# Patient Record
Sex: FEMALE | Race: BLACK OR AFRICAN AMERICAN
[De-identification: names, ages, dates, MRNs, and addresses within clinical notes are randomized per-mention and may not be internally consistent; named-entity substitution may affect disease eponyms.]

---

## 2017-03-16 ENCOUNTER — HOSPITAL ENCOUNTER (OUTPATIENT)
Dept: HOSPITAL 62 - WI | Age: 57
End: 2017-03-16
Attending: NURSE PRACTITIONER
Payer: COMMERCIAL

## 2017-03-16 DIAGNOSIS — Z12.31: Primary | ICD-10-CM

## 2017-03-16 PROCEDURE — 77067 SCR MAMMO BI INCL CAD: CPT

## 2017-03-16 PROCEDURE — G0202 SCR MAMMO BI INCL CAD: HCPCS

## 2017-04-01 ENCOUNTER — HOSPITAL ENCOUNTER (EMERGENCY)
Dept: HOSPITAL 62 - ER | Age: 57
Discharge: HOME | End: 2017-04-01
Payer: COMMERCIAL

## 2017-04-01 VITALS — SYSTOLIC BLOOD PRESSURE: 169 MMHG | DIASTOLIC BLOOD PRESSURE: 89 MMHG

## 2017-04-01 DIAGNOSIS — Z71.6: ICD-10-CM
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
## 2017-04-01 DIAGNOSIS — I10: ICD-10-CM

## 2017-04-01 DIAGNOSIS — F17.210: ICD-10-CM

## 2017-04-01 DIAGNOSIS — S20.211A: Primary | ICD-10-CM

## 2017-04-01 DIAGNOSIS — R07.81: ICD-10-CM

## 2017-04-01 PROCEDURE — 99283 EMERGENCY DEPT VISIT LOW MDM: CPT
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
## 2017-07-20 ENCOUNTER — HOSPITAL ENCOUNTER (EMERGENCY)
Dept: HOSPITAL 62 - ER | Age: 57
Discharge: HOME | End: 2017-07-20
Payer: COMMERCIAL

## 2017-07-20 VITALS — SYSTOLIC BLOOD PRESSURE: 142 MMHG | DIASTOLIC BLOOD PRESSURE: 80 MMHG

## 2017-07-20 DIAGNOSIS — I10: ICD-10-CM

## 2017-07-20 DIAGNOSIS — R51: Primary | ICD-10-CM

## 2017-07-20 LAB
ANION GAP SERPL CALC-SCNC: 12 MMOL/L (ref 5–19)
BASOPHILS # BLD AUTO: 0.1 10^3/UL (ref 0–0.2)
BASOPHILS NFR BLD AUTO: 1.3 % (ref 0–2)
BUN SERPL-MCNC: 13 MG/DL (ref 7–20)
CALCIUM: 9 MG/DL (ref 8.4–10.2)
CHLORIDE SERPL-SCNC: 98 MMOL/L (ref 98–107)
CO2 SERPL-SCNC: 28 MMOL/L (ref 22–30)
CREAT SERPL-MCNC: 0.73 MG/DL (ref 0.52–1.25)
EOSINOPHIL # BLD AUTO: 0.3 10^3/UL (ref 0–0.6)
EOSINOPHIL NFR BLD AUTO: 4.4 % (ref 0–6)
ERYTHROCYTE [DISTWIDTH] IN BLOOD BY AUTOMATED COUNT: 14.4 % (ref 11.5–14)
GLUCOSE SERPL-MCNC: 93 MG/DL (ref 75–110)
HCT VFR BLD CALC: 41.8 % (ref 36–47)
HGB BLD-MCNC: 14.1 G/DL (ref 12–15.5)
HGB HCT DIFFERENCE: 0.5
LYMPHOCYTES # BLD AUTO: 2.5 10^3/UL (ref 0.5–4.7)
LYMPHOCYTES NFR BLD AUTO: 39.2 % (ref 13–45)
MCH RBC QN AUTO: 31.8 PG (ref 27–33.4)
MCHC RBC AUTO-ENTMCNC: 33.6 G/DL (ref 32–36)
MCV RBC AUTO: 95 FL (ref 80–97)
MONOCYTES # BLD AUTO: 0.9 10^3/UL (ref 0.1–1.4)
MONOCYTES NFR BLD AUTO: 14.6 % (ref 3–13)
NEUTROPHILS # BLD AUTO: 2.6 10^3/UL (ref 1.7–8.2)
NEUTS SEG NFR BLD AUTO: 40.5 % (ref 42–78)
POTASSIUM SERPL-SCNC: 3.5 MMOL/L (ref 3.6–5)
RBC # BLD AUTO: 4.42 10^6/UL (ref 3.72–5.28)
SODIUM SERPL-SCNC: 138.3 MMOL/L (ref 137–145)
WBC # BLD AUTO: 6.3 10^3/UL (ref 4–10.5)

## 2017-07-20 PROCEDURE — 85025 COMPLETE CBC W/AUTO DIFF WBC: CPT

## 2017-07-20 PROCEDURE — 36415 COLL VENOUS BLD VENIPUNCTURE: CPT

## 2017-07-20 PROCEDURE — 93005 ELECTROCARDIOGRAM TRACING: CPT

## 2017-07-20 PROCEDURE — 99284 EMERGENCY DEPT VISIT MOD MDM: CPT

## 2017-07-20 PROCEDURE — 93010 ELECTROCARDIOGRAM REPORT: CPT

## 2017-07-20 PROCEDURE — 80048 BASIC METABOLIC PNL TOTAL CA: CPT

## 2017-07-20 NOTE — ER DOCUMENT REPORT
ED General





- General


Chief Complaint: Headache >24 hrs old


Stated Complaint: BLOOD PRESSURE PROBLEM


Time Seen by Provider: 07/20/17 14:30


Notes: 


Patient presents stating that she took her blood pressure at home and noticed 

that it was high.  She says that it was approximately 150/105.  This made her 

anxious.  She also felt that she had some pressure behind her eyes.  Therefore 

she came to the emergency room for evaluation.  Nothing makes the pressure 

better or worse.  It does not radiate.  It has been constant but slightly 

improving over time.  She denies any chest pain or shortness of breath.  No 

nausea vomiting or diarrhea.


TRAVEL OUTSIDE OF THE [place] IN LAST 30 DAYS: No





- Related Data


Allergies/Adverse Reactions: 


 





No Known Allergies Allergy (Verified 07/20/17 14:13)


 








Home Medications: 


 Current Home Medications





Bupropion HCl [Bupropion Xl] 150 mg PO DAILY 07/20/17 [History]


Diazepam 5 mg PO PRN PRN 07/20/17 [History]


Ergocalciferol (Vitamin D2) [Vitamin D2] 1 tab PO DAILY 07/20/17 [History]


Estradiol 2 mg PO DAILY 07/20/17 [History]


Fluoxetine HCl [Prozac 20 mg Capsule] 20 mg PO DAILY 07/20/17 [History]


Meloxicam [Mobic 15 mg Tablet] 15 mg PO DAILY 07/20/17 [History]


Pravastatin Sodium 20 mg PO DAILY 07/20/17 [History]











Past Medical History





- General


Information source: Patient





- Social History


Smoking Status: Never Smoker


Chew tobacco use (# tins/day): No


Frequency of alcohol use: Occasional


Drug Abuse: None


Family History: Reviewed & Not Pertinent


Patient has suicidal ideation: No


Patient has homicidal ideation: No





- Past Medical History


Cardiac Medical History: Reports: Hx Hypercholesterolemia, Hx Hypertension


Renal/ Medical History: Denies: Hx Peritoneal Dialysis


Psychiatric Medical History: Reports: Hx Depression





- Immunizations


Immunizations up to date: Yes


Hx Diphtheria, Pertussis, Tetanus Vaccination: Yes





Review of Systems





- Review of Systems


Constitutional: denies: Chills, Fever


Cardiovascular: denies: Chest pain, Palpitations


Respiratory: denies: Cough, Short of breath


Gastrointestinal: denies: Diarrhea, Vomiting


-: Yes All other systems reviewed and negative





Physical Exam





- Vital signs


Vitals: 


 











Temp Pulse Resp BP Pulse Ox


 


 98.6 F   91   18   137/82 H  97 


 


 07/20/17 14:12  07/20/17 14:12  07/20/17 14:12  07/20/17 14:12  07/20/17 14:12











Interpretation: Normal





- General


General appearance: Appears well, Alert





- HEENT


Head: Normocephalic, Atraumatic


Eyes: Normal


Pupils: PERRL





- Respiratory


Respiratory status: No respiratory distress


Chest status: Nontender


Breath sounds: Normal


Chest palpation: Normal





- Cardiovascular


Rhythm: Regular


Heart sounds: Normal auscultation


Murmur: No





- Abdominal


Inspection: Normal


Distension: No distension


Bowel sounds: Normal


Tenderness: Nontender


Organomegaly: No organomegaly





- Back


Back: Normal, Nontender





- Extremities


General upper extremity: Normal inspection, Nontender, Normal color, Normal ROM

, Normal temperature


General lower extremity: Normal inspection, Nontender, Normal color, Normal ROM

, Normal temperature, Normal weight bearing.  No: Latanya's sign





- Neurological


Neuro grossly intact: Yes


Cognition: Normal


Orientation: AAOx4


Nadia Coma Scale Eye Opening: Spontaneous


Nadia Coma Scale Verbal: Oriented


[place] Coma Scale Motor: Obeys Commands


[place] Coma Scale Total: 15


Speech: Normal


Motor strength normal: LUE, RUE, LLE, RLE


Sensory: Normal





- Psychological


Associated symptoms: Normal affect, Normal mood





- Skin


Skin Temperature: Warm


Skin Moisture: Dry


Skin Color: Normal





Course





- Re-evaluation


Re-evalutation: 





07/20/17 15:15


Patient's blood pressure here is much improved with a systolic in the 130s.  

Laboratories are unremarkable.





- Vital Signs


Vital signs: 


 











Temp Pulse Resp BP Pulse Ox


 


 98.6 F   91   18   137/82 H  97 


 


 07/20/17 14:12  07/20/17 14:12  07/20/17 14:12  07/20/17 14:12  07/20/17 14:12














- Laboratory


Result Diagrams: 


 07/20/17 14:48





 07/20/17 14:48


Laboratory results interpreted by me: 


 











  07/20/17 07/20/17





  14:48 14:48


 


RDW  14.4 H 


 


Seg Neutrophils %  40.5 L 


 


Monocytes %  14.6 H 


 


Potassium   3.5 L














- EKG Interpretation by Me


EKG shows normal: Sinus rhythm


Rate: Normal


Rhythm: NSR


Axis/QRS: No: Right axis deviation, Left axis deviation


Voltage: No: Increased voltage





Discharge





- Discharge


Condition: Good


Disposition: HOME, SELF-CARE


Instructions:  Headache ([facility])


Additional Instructions: 


Follow-up with your primary care physician as scheduled.  Your potassium is 

slightly low.  Try to eat foods that are high in potassium such as strawberries 

and bananas.


Forms:  Elevated Blood Pressure

## 2017-12-01 ENCOUNTER — HOSPITAL ENCOUNTER (EMERGENCY)
Dept: HOSPITAL 62 - ER | Age: 57
Discharge: HOME | End: 2017-12-01
Payer: COMMERCIAL

## 2017-12-01 VITALS — SYSTOLIC BLOOD PRESSURE: 127 MMHG | DIASTOLIC BLOOD PRESSURE: 69 MMHG

## 2017-12-01 DIAGNOSIS — T78.3XXA: Primary | ICD-10-CM

## 2017-12-01 DIAGNOSIS — Z79.899: ICD-10-CM

## 2017-12-01 DIAGNOSIS — I10: ICD-10-CM

## 2017-12-01 DIAGNOSIS — F17.210: ICD-10-CM

## 2017-12-01 PROCEDURE — 99283 EMERGENCY DEPT VISIT LOW MDM: CPT

## 2017-12-01 NOTE — ER DOCUMENT REPORT
ED Allergic Reaction





- General


Mode of Arrival: Ambulatory


Information source: Patient


TRAVEL OUTSIDE OF THE [place] IN LAST 30 DAYS: No





- General


Chief Complaint: Allergic Reaction


Stated Complaint: FACIAL SWELLING


Time Seen by Provider: 12/01/17 06:08


Notes: 





Patient is a 56 year old female that presents to the emergency department today 

with complaints of upper lip swelling for the last x1.5-2 hours. Patient is on 

Lisinopril but has never had this reaction before. Patient denies itching or 

hurting. (DOMONIQUE GLOVER)











56-year-old female patient who is been on lisinopril for the past month in 

addition to amlodipine.  She woke up 4:00 this morning with her upper lip 

swollen.  It does not itch or hurt.  She does recall killing or spraying some 

spiders last night.  The swelling is more to the right side but does cross the 

midline.  There is no swelling to the tongue, or posterior pharynx. (KERI CASTLE)





- Related Data


Allergies/Adverse Reactions: 


 





No Known Allergies Allergy (Verified 07/20/17 14:13)


 











Past Medical History





- General


Information source: Patient





- Social History


Smoking Status: Current Every Day Smoker


Cigarette use (# per day): Yes


Frequency of alcohol use: Social


Drug Abuse: None


Lives with: Family


Family History: Reviewed & Not Pertinent


Patient has suicidal ideation: No


Patient has homicidal ideation: No





- Past Medical History


Cardiac Medical History: Reports: Hx Hypercholesterolemia, Hx Hypertension


Psychiatric Medical History: Reports: Hx Depression


Surgical Hx: Negative





- Immunizations


Immunizations up to date: Yes


Hx Diphtheria, Pertussis, Tetanus Vaccination: Yes





Review of Systems





- Review of Systems


Constitutional: No symptoms reported


EENT: See HPI, Other - upper lip swelling


Cardiovascular: No symptoms reported


Respiratory: No symptoms reported


Gastrointestinal: No symptoms reported


Genitourinary: No symptoms reported


Female Genitourinary: No symptoms reported


Musculoskeletal: No symptoms reported


Skin: No symptoms reported


Hematologic/Lymphatic: No symptoms reported


Neurological/Psychological: No symptoms reported


-: Yes All other systems reviewed and negative





Physical Exam





- Vital signs


Interpretation: Normal





- General


General appearance: Appears well, Alert





- HEENT


Head: Normocephalic, Atraumatic


Eyes: Normal


Pupils: PERRL





- Respiratory


Respiratory status: No respiratory distress


Chest status: Nontender


Breath sounds: Normal


Chest palpation: Normal





- Cardiovascular


Rhythm: Regular


Heart sounds: Normal auscultation


Murmur: No





- Abdominal


Inspection: Normal


Distension: No distension


Bowel sounds: Normal


Tenderness: Nontender


Organomegaly: No organomegaly





- Back


Back: Normal, Nontender





- Extremities


General upper extremity: Normal inspection, Normal ROM.  No: Edema


General lower extremity: Normal inspection, Normal ROM.  No: Edema





- Neurological


Neuro grossly intact: Yes


Cognition: Normal


Orientation: AAOx4


Nadia Coma Scale Eye Opening: Spontaneous


Nadia Coma Scale Verbal: Oriented


[place] Coma Scale Motor: Obeys Commands


Nadia Coma Scale Total: 15


Speech: Normal





- Psychological


Associated symptoms: Normal affect, Normal mood





- Skin


Skin Temperature: Warm


Skin Moisture: Dry


Skin Color: Normal





- Vital signs


Vitals: 


 











Temp Pulse Resp BP Pulse Ox


 


 99 F   104 H  16   148/92 H  99 


 


 12/01/17 05:51  12/01/17 05:51  12/01/17 05:51  12/01/17 05:51  12/01/17 05:51














- HEENT


Notes: 





Upper lip swelling right of center, slightly crosses midline. No posterior 

pharynx swelling. Airway is patent. (DOMONIQUE GLOVER)





Course





- Re-evaluation


Re-evalutation: 





12/01/17 08:17


Over the last 2 hours the upper lip swelling has not progressed, it also has 

not receded.  There is no involvement of the tongue or posterior pharynx. (KERI CASTLE)





- Vital Signs


Vital signs: 


 











Temp Pulse Resp BP Pulse Ox


 


 99 F   104 H  16   148/92 H  99 


 


 12/01/17 05:51  12/01/17 05:51  12/01/17 05:51  12/01/17 05:51  12/01/17 05:51














Discharge





- Discharge


Clinical Impression: 


Angioedema


Qualifiers:


 Encounter type: initial encounter Qualified Code(s): T78.3XXA - Angioneurotic 

edema, initial encounter





Condition: Stable


Disposition: HOME, SELF-CARE


Additional Instructions: 


Angioedema:





   Angioedema is an allergic swelling of the soft tissues of the body. The lips 

and mouth are most commonly involved. Medicication allergy is a common cause, 

especially ACE inhibitor medicine (used for blood pressure control). Food, even 

something you've eaten frequently, can cause angioedema. In many cases it's not 

obvious what caused the swelling.


   Acute treatment may include adrenalin and antihistamines. If the cause is 

known, you must avoid this food or medicine in the future. If angioedema 

affects your air passages, it can be life-threatening.


   Return at once if you develop shortness of breath, faintness, severe pain, 

inability to swallow, or if swelling worsens.swelling worsens.








////////////////////////////////////////////////////////////////////////////////

////////////////////////////////////////////////////////////////////////////////

///////////////////





Your lip swelling is most likely due to angioedema caused by the lisinopril you 

are taking.


You should stop taking the lisinopril.


Follow-up with your primary care provider on Monday to discuss other blood 

pressure management options.


Continue all your other regular medications.





RETURN TO THE EMERGENCY ROOM IF ANY NEW OR WORSENING SYMPTOMS.








Scribe Attestation: 





12/01/17 07:54


I personally performed the services described in the documentation, reviewed 

and edited the documentation which was dictated to the scribe in my presence, 

and it accurately records my words and actions. (KERI CASTLE)





Scribe Documentation





- Scribe


Written by William:: William Raygoza, 12/1/2017 0638


acting as scribe for :: [place]

## 2018-02-12 ENCOUNTER — HOSPITAL ENCOUNTER (OUTPATIENT)
Dept: HOSPITAL 62 - RAD | Age: 58
End: 2018-02-12
Attending: FAMILY MEDICINE
Payer: COMMERCIAL

## 2018-02-12 DIAGNOSIS — M75.102: Primary | ICD-10-CM

## 2018-02-12 PROCEDURE — A9576 INJ PROHANCE MULTIPACK: HCPCS

## 2018-02-12 PROCEDURE — 77002 NEEDLE LOCALIZATION BY XRAY: CPT

## 2018-02-12 PROCEDURE — BP09ZZZ PLAIN RADIOGRAPHY OF LEFT SHOULDER: ICD-10-PCS | Performed by: RADIOLOGY

## 2018-02-12 PROCEDURE — 73222 MRI JOINT UPR EXTREM W/DYE: CPT

## 2018-02-12 PROCEDURE — 23350 INJECTION FOR SHOULDER X-RAY: CPT

## 2018-02-12 NOTE — RADIOLOGY REPORT (SQ)
EXAM DESCRIPTION:  MRI LT UPPER JOINT WITH



COMPLETED DATE/TIME:  2/12/2018 1:53 pm



REASON FOR STUDY:  UNSPEC ROTATOR CUFF TEAR OR RUPTURE, LEFT SHOULDER (M75.102) M75.102  UNSP ROTATR-
CUFF TEAR/RUPTR OF LEFT SHOULDER, NOT TR



COMPARISON:   None.



TECHNIQUE:  Left shoulder images acquired and stored on PACS. Oblique coronal, oblique sagittal, and 
axial imaging to include fat sensitive sequences as T1, water sensitive sequences as FST2/STIR, and c
ontrast sensitive sequences as FST1.



LIMITATIONS:  None.



FINDINGS:  JOINT DISTENTION: Adequate.  No loose bodies.

BONE MARROW AND CORTEX: Normal.

AC JOINT: Intact without evidence of abnormal widening.  Mild undersurface acromial spurring without 
subacromial compromise.

GLENOHUMERAL JOINT: No overt subluxation or dislocation or focal chondral lesion.

ROTATOR CUFF: Irregular diffuse supraspinatus tear.  Full-thickness component with contrast extending
 into the subacromial subdeltoid bursa.  Thinned appearance with some delamination.  Significant retr
action not otherwise suggested.  Subscapularis looks relatively intact.  infraspinatus also looks rel
atively intact.  No cuff muscle atrophy.

LABRUM AND BICEPS LABRAL COMPLEX: No overt labral tear or biceps pathology appreciated.

INFERIOR LABRAL COMPLEX: Intact without evidence of tear or paralabral cyst.

ADJACENT SOFT TISSUES: Mild extravasated in contrast from dorsal arthrography.  No regional mass or a
xillary adenopathy, however.

OTHER: No other significant finding.



IMPRESSION:  1.  Full-thickness supraspinatus cuff tear.



TECHNICAL DOCUMENTATION:  JOB ID:  [id number]

 2011 [employer]- All Rights Reserved

## 2018-02-12 NOTE — RADIOLOGY REPORT (SQ)
EXAM DESCRIPTION:  ARTHRO SHOULDER INJECTION; FLUORO/NEEDLE PLACEMENT



COMPLETED DATE/TIME:  2/12/2018 1:25 pm



REASON FOR STUDY:  UNSPEC ROTATOR CUFF TEAR OR RUPTURE, LEFT SHOULDER (M75.102) M75.102  UNSP ROTATR-
CUFF TEAR/RUPTR OF LEFT SHOULDER, NOT TR



COMPARISON:  None.



FLUOROSCOPY TIME:  0.6 minutes.

3 images saved to PACS.



LIMITATIONS:  None.



PROCEDURE:  Procedure, risks, benefits and alternatives explained to patient who then gave written co
nsent. The left shoulder was marked and a time out was called for correct procedure verification.  Po
sterior entry site marked using fluoroscopic guidance.  Shoulder prepped and draped using sterile rick
hnique.  Local anesthesia achieved using 1% lidocaine injection.  Hypodermic needle introduced into t
he joint space under direct fluoroscopic visualization. Non-ionic contrast instilled to confirm intra
-articular position. Dilute gadolinium solution then injected.  Needle removed and entry site covered
 with sterile bandage. No immediate complications noted.



TECHNIQUE:  Digital images acquired during fluoroscopy and stored on PACS.   Patient immediately take
n to the MR suite for additional imaging.

INJECTION LOCATION: Posterior left shoulder.

CONTRAST TYPE AND AMOUNT: 2 mL Isovue and 9 mL Prohance/Saline mixture.



IMPRESSION:  SUCCESSFUL NEEDLE PLACEMENT AND INJECTION FOR LEFT SHOULDER MR ARTHROGRAM USING POSTERIO
R APPROACH.



COMMENT:  Quality :  Final reports for procedures using fluoroscopy that document radiation exp
osure indices, or exposure time and number of fluorographic images (if radiation exposure indices are
 not available)



TECHNICAL DOCUMENTATION:  JOB ID:  [id number]

 2011 [employer]- All Rights Reserved

## 2018-02-12 NOTE — RADIOLOGY REPORT (SQ)
EXAM DESCRIPTION:  ARTHRO SHOULDER INJECTION; FLUORO/NEEDLE PLACEMENT



COMPLETED DATE/TIME:  2/12/2018 1:25 pm



REASON FOR STUDY:  UNSPEC ROTATOR CUFF TEAR OR RUPTURE, LEFT SHOULDER (M75.102) M75.102  UNSP ROTATR-
CUFF TEAR/RUPTR OF LEFT SHOULDER, NOT TR



COMPARISON:  None.



FLUOROSCOPY TIME:  0.6 minutes.

3 images saved to PACS.



LIMITATIONS:  None.



PROCEDURE:  Procedure, risks, benefits and alternatives explained to patient who then gave written co
nsent. The left shoulder was marked and a time out was called for correct procedure verification.  Po
sterior entry site marked using fluoroscopic guidance.  Shoulder prepped and draped using sterile rick
hnique.  Local anesthesia achieved using 1% lidocaine injection.  Hypodermic needle introduced into t
he joint space under direct fluoroscopic visualization. Non-ionic contrast instilled to confirm intra
-articular position. Dilute gadolinium solution then injected.  Needle removed and entry site covered
 with sterile bandage. No immediate complications noted.



TECHNIQUE:  Digital images acquired during fluoroscopy and stored on PACS.   Patient immediately take
n to the MR suite for additional imaging.

INJECTION LOCATION: Posterior left shoulder.

CONTRAST TYPE AND AMOUNT: 2 mL Isovue and 9 mL Prohance/Saline mixture.



IMPRESSION:  SUCCESSFUL NEEDLE PLACEMENT AND INJECTION FOR LEFT SHOULDER MR ARTHROGRAM USING POSTERIO
R APPROACH.



COMMENT:  Quality :  Final reports for procedures using fluoroscopy that document radiation exp
osure indices, or exposure time and number of fluorographic images (if radiation exposure indices are
 not available)



TECHNICAL DOCUMENTATION:  JOB ID:  [id number]

 2011 [employer]- All Rights Reserved

## 2018-07-30 ENCOUNTER — HOSPITAL ENCOUNTER (OUTPATIENT)
Dept: HOSPITAL 62 - WI | Age: 58
End: 2018-07-30
Attending: NURSE PRACTITIONER
Payer: COMMERCIAL

## 2018-07-30 DIAGNOSIS — Z12.31: Primary | ICD-10-CM

## 2018-07-30 PROCEDURE — 77063 BREAST TOMOSYNTHESIS BI: CPT

## 2018-07-30 PROCEDURE — 77067 SCR MAMMO BI INCL CAD: CPT

## 2018-07-30 NOTE — WOMENS IMAGING REPORT
EXAM DESCRIPTION:  3D SCREENING MAMMO BILAT



COMPLETED DATE/TIME:  7/30/2018 3:29 pm



REASON FOR STUDY:  BILATERAL SCREENING MAMMO 3D/Z12.31 Z12.31  ENCNTR SCREEN MAMMOGRAM FOR MALIGNANT 
NEOPLASM OF DAQUAN



COMPARISON:  3/16/2017.



TECHNIQUE:  Standard craniocaudal and mediolateral oblique views of each breast recorded using digita
l acquisition and breast tomosynthesis.



LIMITATIONS:  None.



FINDINGS:  No masses, calcifications or architectural distortion. No areas of suspicion.

Read with the assistance of CAD.

[facility] - R2 Cenova Version 1.3

.[facility] - R2 Cenova Version 1.3

[facility] - R2 Cenova Version 2.4

.[facility] - R2 Cenova Version 2.4

.[facility] - R2  Version 9.2



IMPRESSION:  NORMAL MAMMOGRAM.  BIRADS 1.



BREAST DENSITY:  c. The breasts are heterogeneously dense, which may obscure small masses.



BIRAD:  1 NEGATIVE



RECOMMENDATION:  ROUTINE SCREENING



COMMENT:  The patient has been notified of the results by letter per SA requirements. Additional no
tification policies are in place for contacting patient with suspicious or incomplete findings.

Quality ID #225: The American College of Radiology recommends an annual screening mammogram for women
 aged 40 years or over. This facility utilizes a reminder system to ensure that all patients receive 
reminder letters, and/or direct phone calls for appointments. This includes reminders for routine scr
eening mammograms, diagnostic mammograms, or other Breast Imaging Interventions when appropriate.  Th
is patient will be placed in the appropriate reminder system.

The American College of Radiology (ACR) has developed recommendations for screening MRI of the breast
s in certain patient populations, to be used in conjunction with mammography.  Breast MRI surveillanc
e may be appropriate for women with more than 20% lifetime risk of developing breast cancer  as deter
mined by genetic testing, significant family history of the disease, or history of mantle radiation f
or Hodgkins Disease.  ACR Practice Guidelines 2008.

[employer]



PQRS 6045F: Fluoroscopic imaging is not utilized for breast tomosynthesis.



TECHNICAL DOCUMENTATION:  FINDING NUMBER: (1)

ASSESSMENT:  (1)

JOB ID:  [id number]

 2011 [employer]- All Rights Reserved



Reading location - IP/workstation name: [email or web address]

## 2018-09-17 ENCOUNTER — HOSPITAL ENCOUNTER (EMERGENCY)
Dept: HOSPITAL 62 - ER | Age: 58
Discharge: HOME | End: 2018-09-17
Payer: COMMERCIAL

## 2018-09-17 DIAGNOSIS — F17.200: ICD-10-CM

## 2018-09-17 DIAGNOSIS — M51.16: Primary | ICD-10-CM

## 2018-09-17 DIAGNOSIS — I10: ICD-10-CM

## 2018-09-17 PROCEDURE — 99283 EMERGENCY DEPT VISIT LOW MDM: CPT
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
## 2019-02-02 ENCOUNTER — HOSPITAL (OUTPATIENT)
Dept: OTHER | Age: 59
End: 2019-02-02
Attending: INTERNAL MEDICINE

## 2019-07-31 ENCOUNTER — HOSPITAL ENCOUNTER (OUTPATIENT)
Dept: HOSPITAL 62 - WI | Age: 59
End: 2019-07-31
Attending: NURSE PRACTITIONER
Payer: COMMERCIAL

## 2019-07-31 DIAGNOSIS — Z12.31: Primary | ICD-10-CM

## 2019-07-31 PROCEDURE — 77067 SCR MAMMO BI INCL CAD: CPT

## 2019-07-31 PROCEDURE — 77063 BREAST TOMOSYNTHESIS BI: CPT

## 2019-07-31 NOTE — WOMENS IMAGING REPORT
EXAM DESCRIPTION:  3D SCREENING MAMMO BILAT



COMPLETED DATE/TIME:  7/31/2019 1:51 pm



REASON FOR STUDY:  Z12.31 ENCOUNTER FOR SCREENING MAMMOGRAM FOR MALIGNANT NEOPLASM OF BREAST Z12.31  
ENCNTR SCREEN MAMMOGRAM FOR MALIGNANT NEOPLASM OF DAQUAN



COMPARISON:  Digital mo synthesis bilateral screening mammogram dated 7/30/2018 and digital bilateral
 screening mammogram dated 3/16/2017.



EXAM PARAMETERS:  Views: Standard craniocaudal and mediolateral oblique views of each breast recorded
 using digital acquisition and breast tomosynthesis.

Read with the assistance of CAD.

.[facility]  Version 9.2



LIMITATIONS:  None.



FINDINGS:  No suspicious masses, suspicious calcifications or architectural distortion. No areas of c
oncern.



IMPRESSION:   NEGATIVE MAMMOGRAM. BIRADS 1.



BREAST DENSITY:  c. The breasts are heterogeneously dense, which may obscure small masses.



BIRAD:  ASSESSMENT:  1 NEGATIVE



RECOMMENDATION:  1. ROUTINE SCREENING



COMMENT:  The patient has been notified of the results by letter per MQSA requirements. Additional no
tification policies are in place for contacting patient with suspicious or incomplete findings.

Quality ID #225: The American College of Radiology recommends an annual screening mammogram for women
 aged 40 years or over. This facility utilizes a reminder system to ensure that all patients receive 
reminder letters, and/or direct phone calls for appointments. This includes reminders for routine scr
eening mammograms, diagnostic mammograms, or other Breast Imaging Interventions when appropriate.  Th
is patient will be placed in the appropriate reminder system.



TECHNICAL DOCUMENTATION:  FINDING NUMBER: (1)

ASSESSMENT:  (1)

JOB ID:  [id number]

 2011 [email or web address]- All Rights Reserved



Reading location - IP/workstation name: [email or web address]

## 2024-08-19 DIAGNOSIS — I73.9 PERIPHERAL VASCULAR DISEASE (CMD): Primary | ICD-10-CM

## 2024-10-24 DIAGNOSIS — I73.9 PERIPHERAL VASCULAR DISEASE (CMD): Primary | ICD-10-CM
